# Patient Record
Sex: MALE | Race: WHITE | NOT HISPANIC OR LATINO | URBAN - METROPOLITAN AREA
[De-identification: names, ages, dates, MRNs, and addresses within clinical notes are randomized per-mention and may not be internally consistent; named-entity substitution may affect disease eponyms.]

---
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
## 2023-06-17 ENCOUNTER — EMERGENCY (EMERGENCY)
Facility: HOSPITAL | Age: 18
LOS: 0 days | Discharge: HOME | End: 2023-06-17
Attending: EMERGENCY MEDICINE
Payer: COMMERCIAL

## 2023-06-17 VITALS
HEART RATE: 98 BPM | SYSTOLIC BLOOD PRESSURE: 136 MMHG | OXYGEN SATURATION: 99 % | DIASTOLIC BLOOD PRESSURE: 71 MMHG | RESPIRATION RATE: 20 BRPM | TEMPERATURE: 99 F

## 2023-06-17 VITALS
HEART RATE: 99 BPM | DIASTOLIC BLOOD PRESSURE: 109 MMHG | SYSTOLIC BLOOD PRESSURE: 172 MMHG | WEIGHT: 300.05 LBS | RESPIRATION RATE: 20 BRPM | TEMPERATURE: 98 F | OXYGEN SATURATION: 99 %

## 2023-06-17 DIAGNOSIS — M25.572 PAIN IN LEFT ANKLE AND JOINTS OF LEFT FOOT: ICD-10-CM
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
## 2023-06-17 DIAGNOSIS — S90.02XA CONTUSION OF LEFT ANKLE, INITIAL ENCOUNTER: ICD-10-CM
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
## 2023-06-17 PROCEDURE — 73590 X-RAY EXAM OF LOWER LEG: CPT | Mod: LT

## 2023-06-17 PROCEDURE — 99284 EMERGENCY DEPT VISIT MOD MDM: CPT | Mod: 25

## 2023-06-17 PROCEDURE — 73630 X-RAY EXAM OF FOOT: CPT | Mod: LT

## 2023-06-17 PROCEDURE — 99284 EMERGENCY DEPT VISIT MOD MDM: CPT

## 2023-06-17 PROCEDURE — 73610 X-RAY EXAM OF ANKLE: CPT | Mod: 26,LT

## 2023-06-17 PROCEDURE — 73630 X-RAY EXAM OF FOOT: CPT | Mod: 26,LT

## 2023-06-17 PROCEDURE — 73590 X-RAY EXAM OF LOWER LEG: CPT | Mod: 26,LT

## 2023-06-17 PROCEDURE — 73610 X-RAY EXAM OF ANKLE: CPT | Mod: LT

## 2023-06-17 RX ORDER — IBUPROFEN 200 MG
600 TABLET ORAL ONCE
Refills: 0 | Status: COMPLETED | OUTPATIENT
Start: 2023-06-17 | End: 2023-06-17

## 2023-06-17 RX ADMIN — Medication 600 MILLIGRAM(S): at 18:37

## 2023-06-17 NOTE — ED ADULT TRIAGE NOTE - CHIEF COMPLAINT QUOTE
per pt "I stepped in a pothole yesterday and hurt my left ankle". Denies LOC and not on blood thinners

## 2023-06-17 NOTE — ED PROVIDER NOTE - CARE PROVIDER_API CALL
Audi Dixon  Orthopaedic Surgery  [address] [place] [address]  Phone: [phone]  Fax: [phone]  Follow Up Time: 1-3 Days

## 2023-06-17 NOTE — ED PROVIDER NOTE - PHYSICAL EXAMINATION
CONSTITUTIONAL: in no apparent distress.   ENT: Hearing is intact with good acuity to spoken voice.  Patient is speaking clearly, not muffled and airway is intact.   RESPIRATORY: No signs of respiratory distress.   CARDIOVASCULAR: Regular rate and rhythm.   BACK: No evidence of trauma or deformity. No midline tenderness. ***No CVA tenderness bilaterally***. Normal ROM.   MS: L ankle with no obvious deformity or swelling, but ecchymosis noticed; tender to palpation; full ROM; sensory function intact; distal pulse present. Rest of the upper and lower extremities unremarkable.   NEURO: A & O x 3. Normal speech. No focal deficit.  PSYCHOLOGICAL: Appropriate mood and affect. Good judgement and insight.

## 2023-06-17 NOTE — ED PROVIDER NOTE - OBJECTIVE STATEMENT
18-year-old male with no significant past medical history who presents with left ankle pain.  Reports that symptoms started yesterday after he accidentally ran into a pathole and rolled his left ankle.  Reports that he has been able to walk but with limited.  Reports the pain is constant and worse with weightbearing.  Denies pain or discomfort or injury elsewhere.

## 2023-06-17 NOTE — ED ADULT NURSE NOTE - NS PRO PASSIVE SMOKE EXP
This is a Telehealth OV to which patient has agreed to.  63 yo pt w/ chronic GERD and recent Hp + CAG, s/p [facility] here for follow up. Has been feeling better since she finished Hp-gastritis Rx.  She did travel to [facility] in 3/21. After coming back she had one episode of p-prandial epigastric pain w/ bloating, and non-bloody loose stools w/o fever, chills, N/V, and spontaneous resolution. A + P CT 9/20: small liver cyst and thickened antrum. EGD 10/20: Hp + CAG, GERD w/o BE and sm HH. Colonoscopy 10/20: L-diverticulosis, melanosis coli and rectosigmoid polyp. No COVID-19 exposure and has received 2 doses of COVID-19 vaccine.
Unknown

## 2023-06-17 NOTE — ED PROVIDER NOTE - PATIENT PORTAL LINK FT
You can access the FollowMyHealth Patient Portal offered by [facility] by registering at the following website: [email or web address]. By joining [facility]’s FollowMyHealth portal, you will also be able to view your health information using other applications (apps) compatible with our system.

## 2023-06-17 NOTE — ED PROVIDER NOTE - PROGRESS NOTE DETAILS
X-ray unremarkable.  Splint applied.  We will have patient follow-up with Ortho outpatient.  Patient stable for discharge.  Crutches given in ED.

## 2023-06-17 NOTE — ED PROVIDER NOTE - NSFOLLOWUPINSTRUCTIONS_ED_ALL_ED_FT
Please make sure to follow up with your primary care doctor in 3 days.      Ankle Pain      The ankle joint holds your body weight and allows you to move around. Ankle pain can occur on either side or the back of one ankle or both ankles. Ankle pain may be sharp and burning or dull and aching. There may be tenderness, stiffness, redness, or warmth around the ankle. Many things can cause ankle pain, including an injury to the area and overuse of the ankle.      Follow these instructions at home:    Activity     •Rest your ankle as told by your health care provider. Avoid any activities that cause ankle pain.      • Do not use the injured limb to support your body weight until your health care provider says that you can. Use crutches as told by your health care provider.      •Do exercises as told by your health care provider.      •Ask your health care provider when it is safe to drive if you have a brace on your ankle.      If you have a brace:     •Wear the brace as told by your health care provider. Remove it only as told by your health care provider.      •Loosen the brace if your toes tingle, become numb, or turn cold and blue.      •Keep the brace clean.    •If the brace is not waterproof:  •Do not let it get wet.      •Cover it with a watertight covering when you take a bath or shower.          If you were given an elastic bandage:   A person wrapping a bandage around an ankle.   •Remove it when you take a bath or a shower.      •Try not to move your ankle very much, but wiggle your toes from time to time. This helps to prevent swelling.      •Adjust the bandage to make it more comfortable if it feels too tight.      •Loosen the bandage if you have numbness or tingling in your foot or if your foot turns cold and blue.        Managing pain, stiffness, and swelling   Bag of ice on a towel on the skin. •If directed, put ice on the painful area.  •If you have a removable brace or elastic bandage, remove it as told by your health care provider.      •Put ice in a plastic bag.      •Place a towel between your skin and the bag.      •Leave the ice on for 20 minutes, 2–3 times a day.        •Move your toes often to avoid stiffness and to lessen swelling.      •Raise (elevate) your ankle above the level of your heart while you are sitting or lying down.      General instructions   •Record information about your pain. Writing down the following may be helpful for you and your health care provider:  •How often you have ankle pain.      •Where the pain is located.      •What the pain feels like.        •If treatment involves wearing a prescribed shoe or insole, make sure you wear it correctly and for as long as told by your health care provider.      •Take over-the-counter and prescription medicines only as told by your health care provider.      •Keep all follow-up visits as told by your health care provider. This is important.        Contact a health care provider if:    •Your pain gets worse.      •Your pain is not relieved with medicines.      •You have a fever or chills.      •You are having more trouble with walking.      •You have new symptoms.        Get help right away if:  •Your foot, leg, toes, or ankle:  •Tingles or becomes numb.      •Becomes swollen.      •Turns pale or blue.          Summary    •Ankle pain can occur on either side or the back of one ankle or both ankles.      •Ankle pain may be sharp and burning or dull and aching.      •Rest your ankle as told by your health care provider. If told, apply ice to the area.      •Take over-the-counter and prescription medicines only as told by your health care provider.      This information is not intended to replace advice given to you by your health care provider. Make sure you discuss any questions you have with your health care provider.

## 2023-06-17 NOTE — ED ADULT NURSE NOTE - NSFALLUNIVINTERV_ED_ALL_ED
Bed/Stretcher in lowest position, wheels locked, appropriate side rails in place/Call bell, personal items and telephone in reach/Instruct patient to call for assistance before getting out of bed/chair/stretcher/Non-slip footwear applied when patient is off stretcher/[place] to call system/Physically safe environment - no spills, clutter or unnecessary equipment/Purposeful proactive rounding/Room/bathroom lighting operational, light cord in reach

## 2023-06-19 ENCOUNTER — APPOINTMENT (OUTPATIENT)
Dept: ORTHOPEDIC SURGERY | Facility: CLINIC | Age: 18
End: 2023-06-19
Payer: COMMERCIAL

## 2023-06-19 VITALS — HEIGHT: 76 IN | WEIGHT: 300 LBS | BODY MASS INDEX: 36.53 KG/M2

## 2023-06-19 DIAGNOSIS — S99.912A UNSPECIFIED INJURY OF LEFT ANKLE, INITIAL ENCOUNTER: ICD-10-CM

## 2023-06-19 PROCEDURE — 99203 OFFICE O/P NEW LOW 30 MIN: CPT

## 2023-06-19 NOTE — DISCUSSION/SUMMARY
[de-identified] : Impression: Left ankle injury and deltoid sprain\par \par Plan: Patient was advised for immobilization with a tall cam walker boot which was placed in the office for support and instability.\par Patient was advised for protected weightbearing.  Crutches if tolerated if not patient can continue with nonweightbearing with crutches.\par Patient was advised for ice therapy, elevation and resting.\par Patient was advised to follow-up in 2-3 weeks for repeat evaluation.\par Patient was advised that if tolerated he can transfer into an Aircast ankle brace.\par Patient was advised of activities as tolerated\par \par Follow-up: 2 to 3 weeks.\par \par

## 2023-06-19 NOTE — IMAGING
[de-identified] : On examination of the left ankle, patient was on a posterior splint which he was removed in the office, patient has significant swelling over the hindfoot and over the medial aspect of the left ankle.\par Patient has decreased range of motion to dorsiflexion and plantarflexion possibly due to significant swelling and pain.\par Patient is able to dorsiflex to neutral.\par Calf is soft, nontender.\par Nontender over the lateral malleolus, nontender over the lateral ligaments.\par Patient has tenderness over the hindfoot and over the deltoid ligaments and over the medial malleolus.\par Patient has generalized swelling and ecchymosis.\par Patient is unable to apply weight to the left ankle.\par Calf is soft, nontender.\par Negative Rush's\par Neurovascular intact.\par \par X-ray of the left ankle was done at [facility] and reviewed in office today, this x-ray was negative for any acute fracture or dislocation

## 2023-07-10 ENCOUNTER — APPOINTMENT (OUTPATIENT)
Dept: ORTHOPEDIC SURGERY | Facility: CLINIC | Age: 18
End: 2023-07-10
Payer: COMMERCIAL

## 2023-07-10 VITALS — HEIGHT: 76 IN | WEIGHT: 300 LBS | BODY MASS INDEX: 36.53 KG/M2

## 2023-07-10 DIAGNOSIS — S93.492A SPRAIN OF OTHER LIGAMENT OF LEFT ANKLE, INITIAL ENCOUNTER: ICD-10-CM

## 2023-07-10 PROCEDURE — 99213 OFFICE O/P EST LOW 20 MIN: CPT

## 2023-07-11 ENCOUNTER — APPOINTMENT (OUTPATIENT)
Dept: ORTHOPEDIC SURGERY | Facility: CLINIC | Age: 18
End: 2023-07-11

## 2023-07-11 PROBLEM — S93.492A SPRAIN OF ANTERIOR TALOFIBULAR LIGAMENT OF LEFT ANKLE, INITIAL ENCOUNTER: Status: ACTIVE | Noted: 2023-07-11

## 2023-07-11 NOTE — HISTORY OF PRESENT ILLNESS
[de-identified] : 18-year-old male patient see me for the first time in regards to his left ankle injury.  He sustained a twisting injury after tripping over a pothole 3 weeks ago.  He is doing well.  He is doing much better.  He discontinued the boot couple days ago.

## 2023-07-11 NOTE — DISCUSSION/SUMMARY
[de-identified] : I discussed the patient's findings with him.  At this time the patient can transition to a lace up ankle brace.  He can start a physical therapy regimen.  I will see him back as needed.  All questions answered.

## 2023-07-11 NOTE — PHYSICAL EXAM
[de-identified] : Alert oriented x3.  Pleasant cooperative throughout exam.  I examined his left lower extremity.  There is some mild swelling over the lateral aspect of the ankle.  However nontender over all bony prominences.  Full range of motion.  The ankle is stable.  He is neurovascular intact.

## 2023-07-11 NOTE — DATA REVIEWED
[FreeTextEntry1] : Reviewed by me personally.  The x-rays demonstrate no evidence of fracture or dislocation.  The ankle joint is congruent.

## 2023-12-24 ENCOUNTER — NON-APPOINTMENT (OUTPATIENT)
Age: 18
End: 2023-12-24

## 2024-01-04 NOTE — ED PROVIDER NOTE - INTERNATIONAL TRAVEL
Documentation copied into the chart for continuity.  Estefany Avilez LPN [place] [facility] months ago    TW  Postpone until after 1/1/24 to place order for [place].       No

## 2024-08-11 ENCOUNTER — NON-APPOINTMENT (OUTPATIENT)
Age: 19
End: 2024-08-11

## 2025-03-07 ENCOUNTER — NON-APPOINTMENT (OUTPATIENT)
Age: 20
End: 2025-03-07